# Patient Record
Sex: MALE | URBAN - METROPOLITAN AREA
[De-identification: names, ages, dates, MRNs, and addresses within clinical notes are randomized per-mention and may not be internally consistent; named-entity substitution may affect disease eponyms.]

---

## 2024-06-21 ENCOUNTER — APPOINTMENT (RX ONLY)
Dept: URBAN - METROPOLITAN AREA CLINIC 12 | Facility: CLINIC | Age: 39
Setting detail: DERMATOLOGY
End: 2024-06-21

## 2024-06-21 DIAGNOSIS — L28.1 PRURIGO NODULARIS: ICD-10-CM

## 2024-06-21 DIAGNOSIS — D49.2 NEOPLASM OF UNSPECIFIED BEHAVIOR OF BONE, SOFT TISSUE, AND SKIN: ICD-10-CM

## 2024-06-21 DIAGNOSIS — L81.0 POSTINFLAMMATORY HYPERPIGMENTATION: ICD-10-CM

## 2024-06-21 PROCEDURE — ? OBSERVATION

## 2024-06-21 PROCEDURE — 99203 OFFICE O/P NEW LOW 30 MIN: CPT

## 2024-06-21 PROCEDURE — ? TREATMENT REGIMEN

## 2024-06-21 PROCEDURE — ? COUNSELING

## 2024-06-21 PROCEDURE — ? SKIN MEDICINALS

## 2024-06-21 ASSESSMENT — LOCATION SIMPLE DESCRIPTION DERM
LOCATION SIMPLE: POSTERIOR SCALP
LOCATION SIMPLE: POSTERIOR SCALP
LOCATION SIMPLE: LEFT FOREARM

## 2024-06-21 ASSESSMENT — LOCATION DETAILED DESCRIPTION DERM
LOCATION DETAILED: RIGHT OCCIPITAL SCALP
LOCATION DETAILED: LEFT PROXIMAL RADIAL DORSAL FOREARM
LOCATION DETAILED: LEFT POSTAURICULAR SKIN
LOCATION DETAILED: MID-OCCIPITAL SCALP
LOCATION DETAILED: MID-OCCIPITAL SCALP

## 2024-06-21 ASSESSMENT — LOCATION ZONE DERM
LOCATION ZONE: ARM
LOCATION ZONE: SCALP
LOCATION ZONE: SCALP

## 2024-06-21 ASSESSMENT — BSA RASH: BSA RASH: 1

## 2024-06-21 ASSESSMENT — SEVERITY ASSESSMENT: SEVERITY: MILD

## 2024-06-21 NOTE — PROCEDURE: OBSERVATION
Detail Level: Detailed
Size Of Lesion In Cm (Optional): 0
Morphology Per Location (Optional): Nevus versus scar photos obtained today

## 2024-06-21 NOTE — PROCEDURE: TREATMENT REGIMEN
Plan: Pt notes having a prior topical treatment for a lesion by a dermatologist on his arm. The lesion resolved but left a dark rula. He is unsure what the lesion or the treatment was. 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 Hydroquinone lightening cream offered, pt is interested in treatment.
Detail Level: Simple
Initiate Treatment: SM Hydroquinone 4.5%, Tretinoin 0.025%, Fluocinolone 0.01%, Niacinamide 4% Cream - Apply pea sized amount per area at night for spot treatment of dark spots. Use for up to 3 months
Plan: Pt admits to picking at the lesions. Pt had 1 ILK injection in the past without significant improvement. Discussed further ILK treatment can be pursued but is unlikely to be significantly helpful if he continues to pick at the lesion. Pt defers ILK at this time and will try to minimize picking. Also recommended urea 20-40% cream to lesions BID to help thin lesions.
Plan: Lesion unable to be fully characterized today. Discussed biopsy vs monitoring. Pt prefers monitoring. Photography obtained. If lesion is growing or becoming symptomatic, pt was advised to RTC sooner for re-evaluation

## 2024-06-21 NOTE — PROCEDURE: [facility]
Sig: Apply to affected areas twice daily
Intro Statement: I recommended the following products:
Sig: Wash affected areas daily.
Sig: Take one twice daily
Sig: Apply to the affected skin twice daily
Sig: Apply a thin layer to the affected skin twice daily
Sig: Apply a thin layer to the affected areas twice daily
Sig: Apply to affected areas on face twice daily
Sig: Apply a thin layer to the areas with decreased hair density 1-2 times daily
Sig: Apply a thin layer to the affected nails daily
Sig: Apply pea sized amount per area at night
Sig: Apply nightly to warts nightly under occlusion
Sig: Apply to affected areas on face daily or 1 hour before event
Sig: Use as directed when washing hair
Sig: Apply twice daily to affected areas for 4 days
Sig: Apply to warts nightly under occlusion. Wait one week after in-office treatment prior to application.
Sig: Take one pill daily
Sig: Apply pea sized amount per area at night for spot treatment of dark spots. Use for 3 months
Sig: Apply twice daily for 5 days
Sig: Take one pill twice daily
Sig: Apply nightly to warts nightly under occlusion as tolerated
Sig: Apply a thin layer to the affected areas daily
Lightening Cream Prescription 1: Hydroquinone 4.5%, Tretinoin 0.025%, Fluocinolone 0.01%, Niacinamide 4% Cream
Sig: Apply a thin layer to the itching areas twice daily as needed
Detail Level: Detailed
Sig: Apply a thin layer to the scar daily
Product Type (1): Lightening Cream